# Patient Record
Sex: FEMALE | Race: WHITE | NOT HISPANIC OR LATINO | Employment: FULL TIME | URBAN - NONMETROPOLITAN AREA
[De-identification: names, ages, dates, MRNs, and addresses within clinical notes are randomized per-mention and may not be internally consistent; named-entity substitution may affect disease eponyms.]

---

## 2022-11-28 ENCOUNTER — OFFICE VISIT (OUTPATIENT)
Dept: OTOLARYNGOLOGY | Facility: CLINIC | Age: 27
End: 2022-11-28

## 2022-11-28 VITALS — BODY MASS INDEX: 24.85 KG/M2 | TEMPERATURE: 97.3 F | WEIGHT: 173.6 LBS | HEIGHT: 70 IN

## 2022-11-28 DIAGNOSIS — J32.9 CHRONIC SINUSITIS, UNSPECIFIED LOCATION: ICD-10-CM

## 2022-11-28 DIAGNOSIS — J34.3 HYPERTROPHY OF INFERIOR NASAL TURBINATE: ICD-10-CM

## 2022-11-28 DIAGNOSIS — J34.2 DEVIATED NASAL SEPTUM: ICD-10-CM

## 2022-11-28 DIAGNOSIS — J31.0 CHRONIC RHINITIS: Primary | ICD-10-CM

## 2022-11-28 PROCEDURE — 99203 OFFICE O/P NEW LOW 30 MIN: CPT | Performed by: EMERGENCY MEDICINE

## 2022-11-28 RX ORDER — MULTIPLE VITAMINS W/ MINERALS TAB 9MG-400MCG
1 TAB ORAL DAILY
COMMUNITY

## 2022-11-28 RX ORDER — BUPROPION HYDROCHLORIDE 200 MG/1
TABLET, EXTENDED RELEASE ORAL
COMMUNITY
Start: 2022-09-12

## 2022-11-28 NOTE — PROGRESS NOTES
RICKY Cheema  [facility] EAR NOSE & THROAT  [address], [address]  [facility] [address]  Fax [phone]  Phone [phone]      Visit Type: NEW PATIENT   Chief Complaint   Patient presents with   • Sinus Problem        HPI  HISTORY OF PRESENT ILLNESS:  Angle Valdivia is a 27 y.o. female presents for an initial evaluation of sinus complaints. Her main symptoms include nasal drainage, nasal congestion, repeated sinusitis and sinus pressure. She has had symptoms that have been relatively constant for the last year. The patient denies: epistaxis, sneezing, itchy nose and itchy eyes. In an average year, the number of antibiotics needed have been: 6-7. Previous antibiotics have included: Amoxicillin and Cephalexin. The patient has also tried: decongestants, prescription nasal sprays, intranasal corticosteroids, intranasal antihistamies, oral steroids and antibiotic therapywith: continued symptoms. She has had allergy testingwhich was: negative. She has not had prior sinus imaging.    She is currently using flonase daily.    History reviewed. No pertinent past medical history.    Past Surgical History:   Procedure Laterality Date   • KNEE SURGERY Bilateral 2011 2012,2015       Family History: Her family history is not on file.     Social History: She  reports that she has never smoked. She has never used smokeless tobacco. She reports current alcohol use. She reports that she does not use drugs.    Home Medications:  Levocetirizine Dihydrochloride, buPROPion SR, and multivitamin with minerals    Allergies:  She has No Known Allergies.       Vital Signs:   Temp:  [97.3 °F (36.3 °C)] 97.3 °F (36.3 °C)  ENT Physical Exam  Constitutional  Appearance: patient appears well-developed, well-nourished and well-groomed,  Communication/Voice: communication appropriate for developmental age; vocal quality normal;  Head and Face  Appearance: head appears  normal, face appears normal and face appears atraumatic;  Palpation: facial palpation normal;  Salivary: glands normal;  Ear  Hearing: intact;  Auricles: right auricle normal; left auricle normal;  External Mastoids: right external mastoid normal; left external mastoid normal;  Ear Canals: right ear canal normal; left ear canal normal;  Tympanic Membranes: right tympanic membrane normal; left tympanic membrane normal;  Nose  Internal Nose: bilateral intranasal mucosa edematous and erythematous; nasal septal deviation present; bilateral inferior turbinates erythematous; with hypertrophy;  Oral Cavity/Oropharynx  Lips: normal;  Teeth: normal;  Gums: gingiva normal;  Tongue: normal;  Oral mucosa: normal;  Hard palate: normal;  Soft palate: normal;  Tonsils: normal;  Neck  Neck: neck normal; neck palpation normal;  Respiratory  Inspection: breathing unlabored; normal breathing rate;  Cardiovascular  Inspection: extremities are warm and well perfused;  Auscultation: regular rate and rhythm;  Lymphatic  Palpation: lymph nodes normal;         Result Review    RESULTS REVIEW    I have reviewed the patients old records in the chart.     Assessment & Plan    Diagnoses and all orders for this visit:    1. Chronic rhinitis (Primary)  -     CT Sinus Without Contrast    2. Chronic sinusitis, unspecified location  -     CT Sinus Without Contrast       Continue nasal sprays as previously prescribed.  For the best response, use your nasal sprays every day without skipping doses. It may take several weeks before the full effect is acheived.   We will get sinus imaging and follow up in 4-6 weeks.    Return in about 6 weeks (around 1/9/2023) for Follow up with me when Dr. Rudolph in clinic, Follow up with CT Sinuses.      Shruthi Armenta, APRN  11/28/22  14:29 CST

## 2023-01-09 ENCOUNTER — OFFICE VISIT (OUTPATIENT)
Dept: OTOLARYNGOLOGY | Facility: CLINIC | Age: 28
End: 2023-01-09
Payer: COMMERCIAL

## 2023-01-09 ENCOUNTER — HOSPITAL ENCOUNTER (OUTPATIENT)
Dept: CT IMAGING | Facility: HOSPITAL | Age: 28
Discharge: HOME | End: 2023-01-09
Admitting: EMERGENCY MEDICINE
Payer: COMMERCIAL

## 2023-01-09 VITALS — TEMPERATURE: 97.3 F | BODY MASS INDEX: 24.82 KG/M2 | WEIGHT: 173.4 LBS | HEIGHT: 70 IN

## 2023-01-09 DIAGNOSIS — J34.3 HYPERTROPHY OF INFERIOR NASAL TURBINATE: ICD-10-CM

## 2023-01-09 DIAGNOSIS — J34.1 MUCOUS RETENTION CYST OF MAXILLARY SINUS: ICD-10-CM

## 2023-01-09 DIAGNOSIS — J31.0 CHRONIC RHINITIS: Primary | ICD-10-CM

## 2023-01-09 DIAGNOSIS — J32.9 CHRONIC SINUSITIS, UNSPECIFIED LOCATION: ICD-10-CM

## 2023-01-09 PROCEDURE — 70486 CT MAXILLOFACIAL W/O DYE: CPT

## 2023-01-09 PROCEDURE — 99214 OFFICE O/P EST MOD 30 MIN: CPT | Performed by: OTOLARYNGOLOGY

## 2023-01-09 RX ORDER — OXYMETAZOLINE HYDROCHLORIDE 0.05 G/100ML
2 SPRAY NASAL ONCE
Status: CANCELLED | OUTPATIENT
Start: 2023-01-09

## 2023-01-09 RX ORDER — OXYMETAZOLINE HYDROCHLORIDE 0.05 G/100ML
2 SPRAY NASAL
Status: CANCELLED | OUTPATIENT
Start: 2023-01-09 | End: 2023-01-10

## 2023-01-09 NOTE — H&P (VIEW-ONLY)
RICKY Cheema [facility]NT [facility] EAR NOSE & THROAT  [address], [address]  [facility] [address]  Fax [phone]  Phone [phone]      Visit Type: FOLLOW UP   Chief Complaint   Patient presents with   • Sinus Problem     CT F/U        HPI  She presents for a follow up evaluation. She has had continued problems with nasal drainage, nasal congestion and sinus pressure. The symptoms are not localized to a particular location. The symptoms severity was described as: moderate The symptoms have been: relatively constant for the last year There have been no identified factors that aggravate the symptoms. The patient has been treated with antibiotics, intranasal fluticasone and steroids in the past with continued symptoms.      History reviewed. No pertinent past medical history.    Past Surgical History:   Procedure Laterality Date   • KNEE SURGERY Bilateral 2011 2012,2015       Family History: Her family history is not on file.     Social History: She  reports that she has never smoked. She has never used smokeless tobacco. She reports current alcohol use. She reports that she does not use drugs.    Home Medications:  Levocetirizine Dihydrochloride, buPROPion SR, and multivitamin with minerals    Allergies:  She has No Known Allergies.       Vital Signs:   Temp:  [97.3 °F (36.3 °C)] 97.3 °F (36.3 °C)  ENT Physical Exam  Constitutional  Appearance: patient appears well-developed, well-nourished and well-groomed,  Communication/Voice: communication appropriate for developmental age; vocal quality normal;  Head and Face  Appearance: head appears normal, face appears normal and face appears atraumatic;  Palpation: facial palpation normal;  Salivary: glands normal;  Ear  Hearing: intact;  Auricles: right auricle normal; left auricle normal;  External Mastoids: right external mastoid normal; left external mastoid normal;  Ear Canals: right ear canal normal;  left ear canal normal;  Tympanic Membranes: right tympanic membrane normal; left tympanic membrane normal;  Nose  Internal Nose: bilateral intranasal mucosa edematous and erythematous; bilateral inferior turbinates with hypertrophy;  Oral Cavity/Oropharynx  Lips: normal;  Teeth: normal;  Gums: gingiva normal;  Tongue: normal;  Oral mucosa: normal;  Hard palate: normal;  Neck  Neck: neck normal;  Respiratory  Inspection: breathing unlabored;  Cardiovascular  Inspection: extremities are warm and well perfused;  Lymphatic  Palpation: lymph nodes normal;         Result Review    RESULTS REVIEW    I have reviewed the patients old records in the chart.     Assessment & Plan    Diagnoses and all orders for this visit:    1. Chronic rhinitis (Primary)  -     oxymetazoline (AFRIN) nasal spray 2 spray  -     oxymetazoline (AFRIN) nasal spray 2 spray  -     dexamethasone (DECADRON) 8 mg in sodium chloride 0.9 % IVPB  -     Case Request; Standing  -     Case Request    2. Chronic sinusitis, unspecified location  -     oxymetazoline (AFRIN) nasal spray 2 spray  -     oxymetazoline (AFRIN) nasal spray 2 spray  -     dexamethasone (DECADRON) 8 mg in sodium chloride 0.9 % IVPB  -     Case Request; Standing  -     Case Request    3. Hypertrophy of inferior nasal turbinate  -     oxymetazoline (AFRIN) nasal spray 2 spray  -     oxymetazoline (AFRIN) nasal spray 2 spray  -     dexamethasone (DECADRON) 8 mg in sodium chloride 0.9 % IVPB  -     Case Request; Standing  -     Case Request    4. Mucous retention cyst of maxillary sinus  -     oxymetazoline (AFRIN) nasal spray 2 spray  -     oxymetazoline (AFRIN) nasal spray 2 spray  -     dexamethasone (DECADRON) 8 mg in sodium chloride 0.9 % IVPB  -     Case Request; Standing  -     Case Request    Other orders  -     Follow Anesthesia Guidelines / Protocol; Future  -     Provide Patient With Instructions on NPO Status  -     Follow Anesthesia Guidelines / Protocol; Standing  -     Verify  NPO Status; Standing  -     Obtain Informed Consent; Standing  -     SCD (Sequential Compression Device) - To Be Placed on Patient in Pre-Op; Standing  -     Patient to Void Prior to Transfer to OR; Standing       Medical and surgical options were discussed including medical and surgical options. Risks, benefits and alternatives were discussed and questions were answered. After considering the options, the patient decided to proceed with surgery.     -----SURGERY SCHEDULING:-----  Schedule functional endoscopic sinus surgery with balloon dilation (Bilateral), TURBINOPLASTY WITH COBLATION    ---INFORMED CONSENT DISCUSSION:---  FUNCTIONAL ENDOSCOPIC SINUS SURGERY: A functional endoscopic sinus surgery was recommended. The risks and benefits were explained including but not limited to pain, bleeding (with the possible need for nasal packing), infection, risks of the general anesthesia, orbital injury with blurred vision or visual loss, cerebrospinal fluid leak, persistent disease, scarring, synichiae and the possibility for the need of reoperation. Possibilities of additional sinus work or less sinus work depending on the status of the nose at the time of the operation was discussed. Alternatives were discussed. No guarantees were made or implied. Questions were asked appropriately answered.    BALLOON SINUPLASTY: The risks and benefits were explained including but not limited to pain, bleeding (with the possible need for nasal packing), infection, risks of the anesthesia, possible incomplete response to anesthesia requiring a conversion to a general anesthesia at a later date, orbital injury with blurred vision or visual loss, cerebrospinal fluid leak, persistent disease and/ or symptoms, scarring, synichiae and the possibility for the need of reoperation. Possibilities of an inability to canulate and dilate the sinuses at the time of the operation werediscussed. Alternatives were discussed. No guarantees were made or  implied. Questions were asked appropriately answered.      ---PREOPERATIVE WORKUP:---  labs/ workup per anesthesia      Return for Post Operatively.      Shruthi LANIE Armenta, APRN  01/09/23  16:10 CST       Physician Attestation  I have seen and examined Angle Valdivia and have reviewed the notes, assessments, and/or procedures and I concur with this documentation.    SUBJECTIVE:  She has had a history of persistent sinusitis complaints.  She has chronic nasal drainage, nasal congestion repeated sinusitis with bilateral maxillary pressure.  She is on approximately 6-7 antibiotics a year.  She has symptoms between the antibiotics.  CT scanning is shown left greater than right maxillary ostiomeatal complex edema and a polypoid swelling of the roof of the left maxillary sinus.  She has inferior turbinate congestion.    OBJECTIVE:  On examination she has a lot of congestion of the inferior turbinates.  There is no septal deviation.    Diagnoses and all orders for this visit:    1. Chronic rhinitis (Primary)  -     oxymetazoline (AFRIN) nasal spray 2 spray  -     oxymetazoline (AFRIN) nasal spray 2 spray  -     dexamethasone (DECADRON) 8 mg in sodium chloride 0.9 % IVPB  -     Case Request; Standing  -     Case Request    2. Chronic sinusitis, unspecified location  -     oxymetazoline (AFRIN) nasal spray 2 spray  -     oxymetazoline (AFRIN) nasal spray 2 spray  -     dexamethasone (DECADRON) 8 mg in sodium chloride 0.9 % IVPB  -     Case Request; Standing  -     Case Request    3. Hypertrophy of inferior nasal turbinate  -     oxymetazoline (AFRIN) nasal spray 2 spray  -     oxymetazoline (AFRIN) nasal spray 2 spray  -     dexamethasone (DECADRON) 8 mg in sodium chloride 0.9 % IVPB  -     Case Request; Standing  -     Case Request    4. Mucous retention cyst of maxillary sinus  -     oxymetazoline (AFRIN) nasal spray 2 spray  -     oxymetazoline (AFRIN) nasal spray 2 spray  -     dexamethasone (DECADRON) 8 mg in sodium  chloride 0.9 % IVPB  -     Case Request; Standing  -     Case Request    Other orders  -     Follow Anesthesia Guidelines / Protocol; Future  -     Provide Patient With Instructions on NPO Status  -     Follow Anesthesia Guidelines / Protocol; Standing  -     Verify NPO Status; Standing  -     Obtain Informed Consent; Standing  -     SCD (Sequential Compression Device) - To Be Placed on Patient in Pre-Op; Standing  -     Patient to Void Prior to Transfer to OR; Standing      We discussed options of continued medical management versus functional endoscopic sinus surgery.  I discussed the possibility of in office balloon sinuplasty and turbinoplasty versus in the hospital OR.  She has elected to proceed with OR turbinate reduction and balloon maxillary sinuplasty.  The risk benefits and alternatives were discussed as mentioned above.    Electronically signed by Jason Rudolph MD, 01/10/23, 4:04 PM CST.

## 2023-01-09 NOTE — PROGRESS NOTES
RICKY Cheema ENT [facility] EAR NOSE & THROAT  [address], [address]  [facility] [address]  Fax [phone]  Phone [phone]      Visit Type: FOLLOW UP   Chief Complaint   Patient presents with   • Sinus Problem     CT F/U        HPI  She presents for a follow up evaluation. She has had continued problems with nasal drainage, nasal congestion and sinus pressure. The symptoms are not localized to a particular location. The symptoms severity was described as: moderate The symptoms have been: relatively constant for the last year There have been no identified factors that aggravate the symptoms. The patient has been treated with antibiotics, intranasal fluticasone and steroids in the past with continued symptoms.      History reviewed. No pertinent past medical history.    Past Surgical History:   Procedure Laterality Date   • KNEE SURGERY Bilateral 2011 2012,2015       Family History: Her family history is not on file.     Social History: She  reports that she has never smoked. She has never used smokeless tobacco. She reports current alcohol use. She reports that she does not use drugs.    Home Medications:  Levocetirizine Dihydrochloride, buPROPion SR, and multivitamin with minerals    Allergies:  She has No Known Allergies.       Vital Signs:   Temp:  [97.3 °F (36.3 °C)] 97.3 °F (36.3 °C)  ENT Physical Exam  Constitutional  Appearance: patient appears well-developed, well-nourished and well-groomed,  Communication/Voice: communication appropriate for developmental age; vocal quality normal;  Head and Face  Appearance: head appears normal, face appears normal and face appears atraumatic;  Palpation: facial palpation normal;  Salivary: glands normal;  Ear  Hearing: intact;  Auricles: right auricle normal; left auricle normal;  External Mastoids: right external mastoid normal; left external mastoid normal;  Ear Canals: right ear canal normal;  left ear canal normal;  Tympanic Membranes: right tympanic membrane normal; left tympanic membrane normal;  Nose  Internal Nose: bilateral intranasal mucosa edematous and erythematous; bilateral inferior turbinates with hypertrophy;  Oral Cavity/Oropharynx  Lips: normal;  Teeth: normal;  Gums: gingiva normal;  Tongue: normal;  Oral mucosa: normal;  Hard palate: normal;  Neck  Neck: neck normal;  Respiratory  Inspection: breathing unlabored;  Cardiovascular  Inspection: extremities are warm and well perfused;  Lymphatic  Palpation: lymph nodes normal;         Result Review    RESULTS REVIEW    I have reviewed the patients old records in the chart.     Assessment & Plan    Diagnoses and all orders for this visit:    1. Chronic rhinitis (Primary)  -     oxymetazoline (AFRIN) nasal spray 2 spray  -     oxymetazoline (AFRIN) nasal spray 2 spray  -     dexamethasone (DECADRON) 8 mg in sodium chloride 0.9 % IVPB  -     Case Request; Standing  -     Case Request    2. Chronic sinusitis, unspecified location  -     oxymetazoline (AFRIN) nasal spray 2 spray  -     oxymetazoline (AFRIN) nasal spray 2 spray  -     dexamethasone (DECADRON) 8 mg in sodium chloride 0.9 % IVPB  -     Case Request; Standing  -     Case Request    3. Hypertrophy of inferior nasal turbinate  -     oxymetazoline (AFRIN) nasal spray 2 spray  -     oxymetazoline (AFRIN) nasal spray 2 spray  -     dexamethasone (DECADRON) 8 mg in sodium chloride 0.9 % IVPB  -     Case Request; Standing  -     Case Request    4. Mucous retention cyst of maxillary sinus  -     oxymetazoline (AFRIN) nasal spray 2 spray  -     oxymetazoline (AFRIN) nasal spray 2 spray  -     dexamethasone (DECADRON) 8 mg in sodium chloride 0.9 % IVPB  -     Case Request; Standing  -     Case Request    Other orders  -     Follow Anesthesia Guidelines / Protocol; Future  -     Provide Patient With Instructions on NPO Status  -     Follow Anesthesia Guidelines / Protocol; Standing  -     Verify  NPO Status; Standing  -     Obtain Informed Consent; Standing  -     SCD (Sequential Compression Device) - To Be Placed on Patient in Pre-Op; Standing  -     Patient to Void Prior to Transfer to OR; Standing       Medical and surgical options were discussed including medical and surgical options. Risks, benefits and alternatives were discussed and questions were answered. After considering the options, the patient decided to proceed with surgery.     -----SURGERY SCHEDULING:-----  Schedule functional endoscopic sinus surgery with balloon dilation (Bilateral), TURBINOPLASTY WITH COBLATION    ---INFORMED CONSENT DISCUSSION:---  FUNCTIONAL ENDOSCOPIC SINUS SURGERY: A functional endoscopic sinus surgery was recommended. The risks and benefits were explained including but not limited to pain, bleeding (with the possible need for nasal packing), infection, risks of the general anesthesia, orbital injury with blurred vision or visual loss, cerebrospinal fluid leak, persistent disease, scarring, synichiae and the possibility for the need of reoperation. Possibilities of additional sinus work or less sinus work depending on the status of the nose at the time of the operation was discussed. Alternatives were discussed. No guarantees were made or implied. Questions were asked appropriately answered.    BALLOON SINUPLASTY: The risks and benefits were explained including but not limited to pain, bleeding (with the possible need for nasal packing), infection, risks of the anesthesia, possible incomplete response to anesthesia requiring a conversion to a general anesthesia at a later date, orbital injury with blurred vision or visual loss, cerebrospinal fluid leak, persistent disease and/ or symptoms, scarring, synichiae and the possibility for the need of reoperation. Possibilities of an inability to canulate and dilate the sinuses at the time of the operation werediscussed. Alternatives were discussed. No guarantees were made or  implied. Questions were asked appropriately answered.      ---PREOPERATIVE WORKUP:---  labs/ workup per anesthesia      Return for Post Operatively.      Shruthi LANIE Armenta, APRN  01/09/23  16:10 CST       Physician Attestation  I have seen and examined Angle Valdivia and have reviewed the notes, assessments, and/or procedures and I concur with this documentation.    SUBJECTIVE:  She has had a history of persistent sinusitis complaints.  She has chronic nasal drainage, nasal congestion repeated sinusitis with bilateral maxillary pressure.  She is on approximately 6-7 antibiotics a year.  She has symptoms between the antibiotics.  CT scanning is shown left greater than right maxillary ostiomeatal complex edema and a polypoid swelling of the roof of the left maxillary sinus.  She has inferior turbinate congestion.    OBJECTIVE:  On examination she has a lot of congestion of the inferior turbinates.  There is no septal deviation.    Diagnoses and all orders for this visit:    1. Chronic rhinitis (Primary)  -     oxymetazoline (AFRIN) nasal spray 2 spray  -     oxymetazoline (AFRIN) nasal spray 2 spray  -     dexamethasone (DECADRON) 8 mg in sodium chloride 0.9 % IVPB  -     Case Request; Standing  -     Case Request    2. Chronic sinusitis, unspecified location  -     oxymetazoline (AFRIN) nasal spray 2 spray  -     oxymetazoline (AFRIN) nasal spray 2 spray  -     dexamethasone (DECADRON) 8 mg in sodium chloride 0.9 % IVPB  -     Case Request; Standing  -     Case Request    3. Hypertrophy of inferior nasal turbinate  -     oxymetazoline (AFRIN) nasal spray 2 spray  -     oxymetazoline (AFRIN) nasal spray 2 spray  -     dexamethasone (DECADRON) 8 mg in sodium chloride 0.9 % IVPB  -     Case Request; Standing  -     Case Request    4. Mucous retention cyst of maxillary sinus  -     oxymetazoline (AFRIN) nasal spray 2 spray  -     oxymetazoline (AFRIN) nasal spray 2 spray  -     dexamethasone (DECADRON) 8 mg in sodium  chloride 0.9 % IVPB  -     Case Request; Standing  -     Case Request    Other orders  -     Follow Anesthesia Guidelines / Protocol; Future  -     Provide Patient With Instructions on NPO Status  -     Follow Anesthesia Guidelines / Protocol; Standing  -     Verify NPO Status; Standing  -     Obtain Informed Consent; Standing  -     SCD (Sequential Compression Device) - To Be Placed on Patient in Pre-Op; Standing  -     Patient to Void Prior to Transfer to OR; Standing      We discussed options of continued medical management versus functional endoscopic sinus surgery.  I discussed the possibility of in office balloon sinuplasty and turbinoplasty versus in the hospital OR.  She has elected to proceed with OR turbinate reduction and balloon maxillary sinuplasty.  The risk benefits and alternatives were discussed as mentioned above.    Electronically signed by Jason Rudolph MD, 01/10/23, 4:04 PM CST.

## 2023-01-10 PROBLEM — J34.1 MUCOUS RETENTION CYST OF MAXILLARY SINUS: Status: ACTIVE | Noted: 2023-01-10

## 2023-01-13 ENCOUNTER — APPOINTMENT (OUTPATIENT)
Dept: PREADMISSION TESTING | Facility: HOSPITAL | Age: 28
End: 2023-01-13
Payer: COMMERCIAL

## 2023-01-16 ENCOUNTER — PRE-ADMISSION TESTING (OUTPATIENT)
Dept: PREADMISSION TESTING | Facility: HOSPITAL | Age: 28
End: 2023-01-16
Payer: COMMERCIAL

## 2023-01-16 VITALS
OXYGEN SATURATION: 100 % | HEART RATE: 75 BPM | HEIGHT: 71 IN | RESPIRATION RATE: 16 BRPM | SYSTOLIC BLOOD PRESSURE: 116 MMHG | DIASTOLIC BLOOD PRESSURE: 70 MMHG | BODY MASS INDEX: 23.61 KG/M2 | WEIGHT: 168.65 LBS

## 2023-01-16 LAB
DEPRECATED RDW RBC AUTO: 48.4 FL (ref 37–54)
ERYTHROCYTE [DISTWIDTH] IN BLOOD BY AUTOMATED COUNT: 13 % (ref 12.3–15.4)
HCT VFR BLD AUTO: 35 % (ref 34–46.6)
HGB BLD-MCNC: 11 G/DL (ref 12–15.9)
MCH RBC QN AUTO: 31.8 PG (ref 26.6–33)
MCHC RBC AUTO-ENTMCNC: 31.4 G/DL (ref 31.5–35.7)
MCV RBC AUTO: 101.2 FL (ref 79–97)
PLATELET # BLD AUTO: 215 10*3/MM3 (ref 140–450)
PMV BLD AUTO: 9.9 FL (ref 6–12)
RBC # BLD AUTO: 3.46 10*6/MM3 (ref 3.77–5.28)
WBC NRBC COR # BLD: 5.29 10*3/MM3 (ref 3.4–10.8)

## 2023-01-16 PROCEDURE — 85027 COMPLETE CBC AUTOMATED: CPT

## 2023-01-16 PROCEDURE — 36415 COLL VENOUS BLD VENIPUNCTURE: CPT

## 2023-01-16 RX ORDER — SACCHAROMYCES BOULARDII 250 MG
250 CAPSULE ORAL DAILY
COMMUNITY

## 2023-01-16 NOTE — DISCHARGE INSTRUCTIONS
Before you come to the hospital        Arrival time: AS DIRECTED BY OFFICE      CHECK WITH YOUR PHYSICIAN (especially if   you are taking diabetes medicines or blood thinners)              Eating and drinking restrictions prior to scheduled arrival time    2 Hours before arrival time STOP   Drinking Clear liquids (water, apple juice-no pulp)     6 Hours before arrival time STOP   Milk or drinks that contain milk, full liquids    6 Hours before arrival time STOP   Light meals or foods, such as toast or cereal    8 Hours before arrival time STOP   Heavy foods, such as meat, fried foods, or fatty foods    (It is extremely important that you follow these guidelines to prevent delay or cancelation of your procedure)     Clear Liquids  Water and flavored water                                                                      Clear Fruit juices, such as cranberry juice and apple juice.  Black coffee (NO cream of any kind, including powdered).  Plain tea  Clear bouillon or broth.  Flavored gelatin.  Soda.  Gatorade or Powerade.  Full liquid examples  Juices that have pulp.  Frozen ice pops that contain fruit pieces.  Coffee with creamer  Milk.  Yogurt.                MANAGING PAIN AFTER SURGERY    We know you are probably wondering what your pain will be like after surgery.  Following surgery it is unrealistic to expect you will not have pain.   Pain is how our bodies let us know that something is wrong or cautions us to be careful.  That said, our goal is to make your pain tolerable.    Methods we may use to treat your pain include (oral or IV medications, PCAs, epidurals, nerve blocks, etc.)   While some procedures require IV pain medications for a short time after surgery, transitioning to pain medications by mouth allows for better management of pain.   Your nurse will encourage you to take oral pain medications whenever possible.  IV medications work almost immediately, but only last a short while.  Taking  medications by mouth allows for a more constant level of medication in your blood stream for a longer period of time.      Once your pain is out of control it is harder to get back under control.  It is important you are aware when your next dose of pain medication is due.  If you are admitted, your nurse may write the time of your next dose on the white board in your room to help you remember.      We are interested in your pain and encourage you to inform us about aggravating factors during your visit.   Many times a simple repositioning every few hours can make a big difference.    If your physician says it is okay, do not let your pain prevent you from getting out of bed. Be sure to call your nurse for assistance prior to getting up so you do not fall.      Before surgery, please decide your tolerable pain goal.  These faces help describe the pain ratings we use on a 0-10 scale.   Be prepared to tell us your goal and whether or not you take pain or anxiety medications at home.          Preparing for Surgery  Preparing for surgery is an important part of your care. It can make things go more smoothly and help you avoid complications. The steps leading up to surgery may vary among hospitals. Follow all instructions given to you by your health care providers. Ask questions if you do not understand something. Talk about any concerns that you have.  Here are some questions to consider asking before your surgery:  If my surgery is not an emergency (is elective), when would be the best time to have the surgery?  What arrangements do I need to make for work, home, or school?  What will my recovery be like? How long will it be before I can return to normal activities?  Will I need to prepare my home? Will I need to arrange care for me or my children?  Should I expect to have pain after surgery? What are my pain management options? Are there nonmedical options that I can try for pain?  Tell a health care provider  about:  Any allergies you have.  All medicines you are taking, including vitamins, herbs, eye drops, creams, and over-the-counter medicines.  Any problems you or family members have had with anesthetic medicines.  Any blood disorders you have.  Any surgeries you have had.  Any medical conditions you have.  Whether you are pregnant or may be pregnant.  What are the risks?  The risks and complications of surgery depend on the specific procedure that you have. Discuss all the risks with your health care providers before your surgery. Ask about common surgical complications, which may include:  Infection.  Bleeding or a need for blood replacement (transfusion).  Allergic reactions to medicines.  Damage to surrounding nerves, tissues, or structures.  A blood clot.  Scarring.  Failure of the surgery to correct the problem.  Follow these instructions before the procedure:  Several days or weeks before your procedure  You may have a physical exam by your primary health care provider to make sure it is safe for you to have surgery.  You may have testing. This may include a chest X-ray, blood and urine tests, electrocardiogram (ECG), or other testing.  Ask your health care provider about:  Changing or stopping your regular medicines. This is especially important if you are taking diabetes medicines or blood thinners.  Taking medicines such as aspirin and ibuprofen. These medicines can thin your blood. Do not take these medicines unless your health care provider tells you to take them.  Taking over-the-counter medicines, vitamins, herbs, and supplements.  Do not use any products that contain nicotine or tobacco, such as cigarettes and e-cigarettes. If you need help quitting, ask your health care provider.  Avoid alcohol.  Ask your health care provider if there are exercises you can do to prepare for surgery.  Eat a healthy diet.   Plan to have someone take you home from the hospital or clinic.  Plan to have a responsible adult  care for you for at least 24 hours after you leave the hospital or clinic. This is important.  The day before your procedure  You may be given antibiotic medicine to take by mouth to help prevent infection. Take it as told by your health care provider.  You may be asked to shower with a germ-killing soap.  Follow instructions from your health care provider about eating and drinking restrictions. This includes gum, mints and hard candy.  Pack comfortable clothes according to your procedure.   The day of your procedure  You may need to take another shower with a germ-killing soap before you leave home in the morning.  With a small sip of water, take only the medicines that you are told to take.  Remove all jewelry including rings.   Leave anything you consider valuable at home except hearing aids if needed.  You do not need to bring your home medications into the hospital.   Do not wear any makeup, nail polish, powder, deodorant, lotion, hair accessories, or anything on your skin or body except your clothes.  If you will be staying in the hospital, bring a case to hold your glasses, contacts, or dentures. You may also want to bring your robe and non-skid footwear.       (Do not use denture adhesives since you will be asked to remove them during  surgery).   If you wear oxygen at home, bring it with you the day of surgery.  If instructed by your health care provider, bring your sleep apnea device with you on the day of your surgery (if this applies to you).  You may want to leave your suitcase and sleep apnea device in the car until after surgery.   Arrive at the hospital as scheduled.  Bring a friend or family member with you who can help to answer questions and be present while you meet with your health care provider.  At the hospital  When you arrive at the hospital:  Go to registration located at the main entrance of the hospital. You will be registered and given a beeper and a sticker sheet. Take the stickers to  the Outpatient nurses desk and place in the black tray. This is to notify staff that you have arrived. Then return to the lobby to wait.   When your beeper lights up and vibrates proceed through the double doors, under the stairs, and a member of the Outpatient Surgery staff will escort you to your preoperative room.  You may have to wear compression sleeves. These help to prevent blood clots and reduce swelling in your legs.  An IV may be inserted into one of your veins.              In the operating room, you may be given one or more of the following:        A medicine to help you relax (sedative).        A medicine to numb the area (local anesthetic).        A medicine to make you fall asleep (general anesthetic).        A medicine that is injected into an area of your body to numb everything below the                      injection site (regional anesthetic).  You may be given an antibiotic through your IV to help prevent infection.  Your surgical site will be marked or identified.    Contact a health care provider if you:  Develop a fever of more than 100.4°F (38°C) or other feelings of illness during the 48 hours before your surgery.  Have symptoms that get worse.  Have questions or concerns about your surgery.  Summary  Preparing for surgery can make the procedure go more smoothly and lower your risk of complications.  Before surgery, make a list of questions and concerns to discuss with your surgeon. Ask about the risks and possible complications.  In the days or weeks before your surgery, follow all instructions from your health care provider. You may need to stop smoking, avoid alcohol, follow eating restrictions, and change or stop your regular medicines.  Contact your surgeon if you develop a fever or other signs of illness during the few days before your surgery.  This information is not intended to replace advice given to you by your health care provider. Make sure you discuss any questions you have  with your health care provider.  Document Revised: 12/21/2018 Document Reviewed: 10/23/2018  Elsevier Patient Education © 2021 [employer]

## 2023-01-17 ENCOUNTER — TELEPHONE (OUTPATIENT)
Dept: OTOLARYNGOLOGY | Facility: CLINIC | Age: 28
End: 2023-01-17
Payer: COMMERCIAL

## 2023-01-18 ENCOUNTER — HOSPITAL ENCOUNTER (OUTPATIENT)
Facility: HOSPITAL | Age: 28
Setting detail: HOSPITAL OUTPATIENT SURGERY
Discharge: HOME | End: 2023-01-18
Attending: OTOLARYNGOLOGY | Admitting: OTOLARYNGOLOGY
Payer: COMMERCIAL

## 2023-01-18 ENCOUNTER — ANESTHESIA EVENT (OUTPATIENT)
Dept: PERIOP | Facility: HOSPITAL | Age: 28
End: 2023-01-18
Payer: COMMERCIAL

## 2023-01-18 ENCOUNTER — ANESTHESIA (OUTPATIENT)
Dept: PERIOP | Facility: HOSPITAL | Age: 28
End: 2023-01-18
Payer: COMMERCIAL

## 2023-01-18 VITALS
HEART RATE: 68 BPM | OXYGEN SATURATION: 97 % | TEMPERATURE: 97.5 F | SYSTOLIC BLOOD PRESSURE: 121 MMHG | DIASTOLIC BLOOD PRESSURE: 83 MMHG | RESPIRATION RATE: 16 BRPM

## 2023-01-18 DIAGNOSIS — J31.0 CHRONIC RHINITIS: ICD-10-CM

## 2023-01-18 DIAGNOSIS — J34.3 HYPERTROPHY OF INFERIOR NASAL TURBINATE: ICD-10-CM

## 2023-01-18 DIAGNOSIS — J34.1 MUCOUS RETENTION CYST OF MAXILLARY SINUS: ICD-10-CM

## 2023-01-18 DIAGNOSIS — J32.9 CHRONIC SINUSITIS, UNSPECIFIED LOCATION: ICD-10-CM

## 2023-01-18 LAB — B-HCG UR QL: NEGATIVE

## 2023-01-18 PROCEDURE — 30802 ABLATE INF TURBINATE SUBMUC: CPT | Performed by: OTOLARYNGOLOGY

## 2023-01-18 PROCEDURE — 25010000002 DEXAMETHASONE PER 1 MG: Performed by: NURSE ANESTHETIST, CERTIFIED REGISTERED

## 2023-01-18 PROCEDURE — 25010000002 EPINEPHRINE PER 0.1 MG: Performed by: OTOLARYNGOLOGY

## 2023-01-18 PROCEDURE — 81025 URINE PREGNANCY TEST: CPT | Performed by: OTOLARYNGOLOGY

## 2023-01-18 PROCEDURE — C1726 CATH, BAL DIL, NON-VASCULAR: HCPCS | Performed by: OTOLARYNGOLOGY

## 2023-01-18 PROCEDURE — 31295 NSL/SINS NDSC SURG MAX SINS: CPT | Performed by: OTOLARYNGOLOGY

## 2023-01-18 PROCEDURE — 25010000002 DEXAMETHASONE PER 1 MG: Performed by: EMERGENCY MEDICINE

## 2023-01-18 PROCEDURE — 30930 THER FX NASAL INF TURBINATE: CPT | Performed by: OTOLARYNGOLOGY

## 2023-01-18 PROCEDURE — 25010000002 ONDANSETRON PER 1 MG: Performed by: NURSE ANESTHETIST, CERTIFIED REGISTERED

## 2023-01-18 PROCEDURE — 25010000002 FENTANYL CITRATE (PF) 100 MCG/2ML SOLUTION: Performed by: NURSE ANESTHETIST, CERTIFIED REGISTERED

## 2023-01-18 PROCEDURE — 25010000002 PROPOFOL 10 MG/ML EMULSION: Performed by: NURSE ANESTHETIST, CERTIFIED REGISTERED

## 2023-01-18 RX ORDER — IBUPROFEN 600 MG/1
600 TABLET ORAL ONCE AS NEEDED
Status: DISCONTINUED | OUTPATIENT
Start: 2023-01-18 | End: 2023-01-18 | Stop reason: HOSPADM

## 2023-01-18 RX ORDER — LIDOCAINE HYDROCHLORIDE 20 MG/ML
INJECTION, SOLUTION EPIDURAL; INFILTRATION; INTRACAUDAL; PERINEURAL AS NEEDED
Status: DISCONTINUED | OUTPATIENT
Start: 2023-01-18 | End: 2023-01-18 | Stop reason: SURG

## 2023-01-18 RX ORDER — LIDOCAINE HYDROCHLORIDE 10 MG/ML
0.5 INJECTION, SOLUTION EPIDURAL; INFILTRATION; INTRACAUDAL; PERINEURAL ONCE AS NEEDED
Status: DISCONTINUED | OUTPATIENT
Start: 2023-01-18 | End: 2023-01-18 | Stop reason: HOSPADM

## 2023-01-18 RX ORDER — NALOXONE HCL 0.4 MG/ML
0.4 VIAL (ML) INJECTION AS NEEDED
Status: DISCONTINUED | OUTPATIENT
Start: 2023-01-18 | End: 2023-01-18 | Stop reason: HOSPADM

## 2023-01-18 RX ORDER — FLUMAZENIL 0.1 MG/ML
0.2 INJECTION INTRAVENOUS AS NEEDED
Status: DISCONTINUED | OUTPATIENT
Start: 2023-01-18 | End: 2023-01-18 | Stop reason: HOSPADM

## 2023-01-18 RX ORDER — OXYMETAZOLINE HYDROCHLORIDE 0.05 G/100ML
2 SPRAY NASAL
Status: COMPLETED | OUTPATIENT
Start: 2023-01-18 | End: 2023-01-18

## 2023-01-18 RX ORDER — OXYMETAZOLINE HYDROCHLORIDE 0.05 G/100ML
SPRAY NASAL AS NEEDED
Status: DISCONTINUED | OUTPATIENT
Start: 2023-01-18 | End: 2023-01-18 | Stop reason: HOSPADM

## 2023-01-18 RX ORDER — ROCURONIUM BROMIDE 10 MG/ML
INJECTION, SOLUTION INTRAVENOUS AS NEEDED
Status: DISCONTINUED | OUTPATIENT
Start: 2023-01-18 | End: 2023-01-18 | Stop reason: SURG

## 2023-01-18 RX ORDER — SODIUM CHLORIDE, SODIUM LACTATE, POTASSIUM CHLORIDE, CALCIUM CHLORIDE 600; 310; 30; 20 MG/100ML; MG/100ML; MG/100ML; MG/100ML
1000 INJECTION, SOLUTION INTRAVENOUS CONTINUOUS
Status: DISCONTINUED | OUTPATIENT
Start: 2023-01-18 | End: 2023-01-18 | Stop reason: HOSPADM

## 2023-01-18 RX ORDER — ONDANSETRON 2 MG/ML
4 INJECTION INTRAMUSCULAR; INTRAVENOUS ONCE AS NEEDED
Status: DISCONTINUED | OUTPATIENT
Start: 2023-01-18 | End: 2023-01-18 | Stop reason: HOSPADM

## 2023-01-18 RX ORDER — DEXAMETHASONE SODIUM PHOSPHATE 4 MG/ML
8 INJECTION, SOLUTION INTRA-ARTICULAR; INTRALESIONAL; INTRAMUSCULAR; INTRAVENOUS; SOFT TISSUE
Status: DISCONTINUED | OUTPATIENT
Start: 2023-01-18 | End: 2023-01-18 | Stop reason: HOSPADM

## 2023-01-18 RX ORDER — DROPERIDOL 2.5 MG/ML
0.62 INJECTION, SOLUTION INTRAMUSCULAR; INTRAVENOUS ONCE AS NEEDED
Status: DISCONTINUED | OUTPATIENT
Start: 2023-01-18 | End: 2023-01-18 | Stop reason: HOSPADM

## 2023-01-18 RX ORDER — SODIUM CHLORIDE 0.9 % (FLUSH) 0.9 %
10 SYRINGE (ML) INJECTION AS NEEDED
Status: DISCONTINUED | OUTPATIENT
Start: 2023-01-18 | End: 2023-01-18 | Stop reason: HOSPADM

## 2023-01-18 RX ORDER — LABETALOL HYDROCHLORIDE 5 MG/ML
5 INJECTION, SOLUTION INTRAVENOUS
Status: DISCONTINUED | OUTPATIENT
Start: 2023-01-18 | End: 2023-01-18 | Stop reason: HOSPADM

## 2023-01-18 RX ORDER — HYDROCODONE BITARTRATE AND ACETAMINOPHEN 5; 325 MG/1; MG/1
1 TABLET ORAL ONCE AS NEEDED
Status: DISCONTINUED | OUTPATIENT
Start: 2023-01-18 | End: 2023-01-18 | Stop reason: HOSPADM

## 2023-01-18 RX ORDER — OXYCODONE AND ACETAMINOPHEN 7.5; 325 MG/1; MG/1
2 TABLET ORAL EVERY 4 HOURS PRN
Status: DISCONTINUED | OUTPATIENT
Start: 2023-01-18 | End: 2023-01-18 | Stop reason: HOSPADM

## 2023-01-18 RX ORDER — MIDAZOLAM HYDROCHLORIDE 1 MG/ML
1 INJECTION INTRAMUSCULAR; INTRAVENOUS
Status: DISCONTINUED | OUTPATIENT
Start: 2023-01-18 | End: 2023-01-18 | Stop reason: HOSPADM

## 2023-01-18 RX ORDER — FENTANYL CITRATE 50 UG/ML
25 INJECTION, SOLUTION INTRAMUSCULAR; INTRAVENOUS
Status: DISCONTINUED | OUTPATIENT
Start: 2023-01-18 | End: 2023-01-18 | Stop reason: HOSPADM

## 2023-01-18 RX ORDER — MAGNESIUM HYDROXIDE 1200 MG/15ML
LIQUID ORAL AS NEEDED
Status: DISCONTINUED | OUTPATIENT
Start: 2023-01-18 | End: 2023-01-18 | Stop reason: HOSPADM

## 2023-01-18 RX ORDER — FENTANYL CITRATE 50 UG/ML
INJECTION, SOLUTION INTRAMUSCULAR; INTRAVENOUS AS NEEDED
Status: DISCONTINUED | OUTPATIENT
Start: 2023-01-18 | End: 2023-01-18 | Stop reason: SURG

## 2023-01-18 RX ORDER — EPINEPHRINE 1 MG/ML
INJECTION, SOLUTION, CONCENTRATE INTRAVENOUS AS NEEDED
Status: DISCONTINUED | OUTPATIENT
Start: 2023-01-18 | End: 2023-01-18 | Stop reason: HOSPADM

## 2023-01-18 RX ORDER — OXYCODONE AND ACETAMINOPHEN 10; 325 MG/1; MG/1
1 TABLET ORAL ONCE AS NEEDED
Status: COMPLETED | OUTPATIENT
Start: 2023-01-18 | End: 2023-01-18

## 2023-01-18 RX ORDER — SODIUM CHLORIDE 0.9 % (FLUSH) 0.9 %
SYRINGE (ML) INJECTION AS NEEDED
Status: DISCONTINUED | OUTPATIENT
Start: 2023-01-18 | End: 2023-01-18 | Stop reason: HOSPADM

## 2023-01-18 RX ORDER — DEXAMETHASONE SODIUM PHOSPHATE 4 MG/ML
INJECTION, SOLUTION INTRA-ARTICULAR; INTRALESIONAL; INTRAMUSCULAR; INTRAVENOUS; SOFT TISSUE AS NEEDED
Status: DISCONTINUED | OUTPATIENT
Start: 2023-01-18 | End: 2023-01-18 | Stop reason: SURG

## 2023-01-18 RX ORDER — DEXTROSE MONOHYDRATE 25 G/50ML
12.5 INJECTION, SOLUTION INTRAVENOUS AS NEEDED
Status: DISCONTINUED | OUTPATIENT
Start: 2023-01-18 | End: 2023-01-18 | Stop reason: HOSPADM

## 2023-01-18 RX ORDER — OXYMETAZOLINE HYDROCHLORIDE 0.05 G/100ML
2 SPRAY NASAL ONCE
Status: DISCONTINUED | OUTPATIENT
Start: 2023-01-18 | End: 2023-01-18 | Stop reason: HOSPADM

## 2023-01-18 RX ORDER — SODIUM CHLORIDE 0.9 % (FLUSH) 0.9 %
3 SYRINGE (ML) INJECTION AS NEEDED
Status: DISCONTINUED | OUTPATIENT
Start: 2023-01-18 | End: 2023-01-18 | Stop reason: HOSPADM

## 2023-01-18 RX ORDER — LIDOCAINE HYDROCHLORIDE AND EPINEPHRINE 10; 10 MG/ML; UG/ML
INJECTION, SOLUTION INFILTRATION; PERINEURAL AS NEEDED
Status: DISCONTINUED | OUTPATIENT
Start: 2023-01-18 | End: 2023-01-18 | Stop reason: HOSPADM

## 2023-01-18 RX ORDER — PROPOFOL 10 MG/ML
VIAL (ML) INTRAVENOUS AS NEEDED
Status: DISCONTINUED | OUTPATIENT
Start: 2023-01-18 | End: 2023-01-18 | Stop reason: SURG

## 2023-01-18 RX ORDER — ONDANSETRON 2 MG/ML
INJECTION INTRAMUSCULAR; INTRAVENOUS AS NEEDED
Status: DISCONTINUED | OUTPATIENT
Start: 2023-01-18 | End: 2023-01-18 | Stop reason: SURG

## 2023-01-18 RX ORDER — HYDROCODONE BITARTRATE AND ACETAMINOPHEN 5; 325 MG/1; MG/1
1 TABLET ORAL EVERY 4 HOURS PRN
Qty: 15 TABLET | Refills: 0 | Status: SHIPPED | OUTPATIENT
Start: 2023-01-18 | End: 2023-01-21

## 2023-01-18 RX ORDER — SODIUM CHLORIDE 0.9 % (FLUSH) 0.9 %
10 SYRINGE (ML) INJECTION EVERY 12 HOURS SCHEDULED
Status: DISCONTINUED | OUTPATIENT
Start: 2023-01-18 | End: 2023-01-18 | Stop reason: HOSPADM

## 2023-01-18 RX ORDER — SCOLOPAMINE TRANSDERMAL SYSTEM 1 MG/1
1 PATCH, EXTENDED RELEASE TRANSDERMAL ONCE
Status: DISCONTINUED | OUTPATIENT
Start: 2023-01-18 | End: 2023-01-18 | Stop reason: HOSPADM

## 2023-01-18 RX ORDER — SUCCINYLCHOLINE/SOD CL,ISO/PF 200MG/10ML
SYRINGE (ML) INTRAVENOUS AS NEEDED
Status: DISCONTINUED | OUTPATIENT
Start: 2023-01-18 | End: 2023-01-18 | Stop reason: SURG

## 2023-01-18 RX ORDER — SODIUM CHLORIDE, SODIUM LACTATE, POTASSIUM CHLORIDE, CALCIUM CHLORIDE 600; 310; 30; 20 MG/100ML; MG/100ML; MG/100ML; MG/100ML
9 INJECTION, SOLUTION INTRAVENOUS CONTINUOUS
Status: DISCONTINUED | OUTPATIENT
Start: 2023-01-18 | End: 2023-01-18 | Stop reason: HOSPADM

## 2023-01-18 RX ADMIN — DEXAMETHASONE SODIUM PHOSPHATE 8 MG: 4 INJECTION INTRA-ARTICULAR; INTRALESIONAL; INTRAMUSCULAR; INTRAVENOUS; SOFT TISSUE at 08:05

## 2023-01-18 RX ADMIN — Medication 160 MG: at 09:52

## 2023-01-18 RX ADMIN — Medication 2 SPRAY: at 08:05

## 2023-01-18 RX ADMIN — SODIUM CHLORIDE, POTASSIUM CHLORIDE, SODIUM LACTATE AND CALCIUM CHLORIDE 1000 ML: 600; 310; 30; 20 INJECTION, SOLUTION INTRAVENOUS at 07:31

## 2023-01-18 RX ADMIN — DEXAMETHASONE SODIUM PHOSPHATE 4 MG: 4 INJECTION, SOLUTION INTRA-ARTICULAR; INTRALESIONAL; INTRAMUSCULAR; INTRAVENOUS; SOFT TISSUE at 10:14

## 2023-01-18 RX ADMIN — LIDOCAINE HYDROCHLORIDE 100 MG: 20 INJECTION, SOLUTION EPIDURAL; INFILTRATION; INTRACAUDAL; PERINEURAL at 09:52

## 2023-01-18 RX ADMIN — FENTANYL CITRATE 100 MCG: 50 INJECTION INTRAMUSCULAR; INTRAVENOUS at 09:52

## 2023-01-18 RX ADMIN — ROCURONIUM BROMIDE 25 MG: 50 INJECTION INTRAVENOUS at 10:03

## 2023-01-18 RX ADMIN — PROPOFOL INJECTABLE EMULSION 180 MG: 10 INJECTION, EMULSION INTRAVENOUS at 09:52

## 2023-01-18 RX ADMIN — OXYCODONE AND ACETAMINOPHEN 1 TABLET: 325; 10 TABLET ORAL at 11:08

## 2023-01-18 RX ADMIN — ONDANSETRON 4 MG: 2 INJECTION INTRAMUSCULAR; INTRAVENOUS at 10:14

## 2023-01-18 RX ADMIN — SCOPALAMINE 1 PATCH: 1 PATCH, EXTENDED RELEASE TRANSDERMAL at 08:05

## 2023-01-18 RX ADMIN — ROCURONIUM BROMIDE 5 MG: 50 INJECTION INTRAVENOUS at 09:52

## 2023-01-18 NOTE — OP NOTE
Jason Rudolph MD   Operative Note    Angle Valdivia  1/18/2023    Pre-op Diagnosis:   Chronic rhinitis [J31.0]  Chronic sinusitis, unspecified location [J32.9]  Hypertrophy of inferior nasal turbinate [J34.3]  Mucous retention cyst of maxillary sinus [J34.1]    Post-op Diagnosis:     Post-Op Diagnosis Codes:     * Chronic rhinitis [J31.0]     * Chronic sinusitis, unspecified location [J32.9]     * Hypertrophy of inferior nasal turbinate [J34.3]     * Mucous retention cyst of maxillary sinus [J34.1]    Procedure/CPT® Codes:  MS NASAL/SINUS NDSC SURG W/DILATION MAXILLARY SINUS [53310]  MS ABLTJ SOF TISS INF TURBS UNI/BI SUPFC INTRAMURAL [25037]  MS FRACTURE NASAL INFERIOR TURBINATE THERAPEUTIC [86608]    Procedure(s):  Bilateral maxillary sinus balloon dilation (Bilateral)  bilateral Coblation inferior turbinoplasty with outfracturing (N/A)     Surgeon(s):  Jason Rudolph MD    Anesthesia: General    Staff:   Circulator: Lulu Stroud RN  Scrub Person: Vandana Hawley; Constance Sims CST    Estimated Blood Loss:   minimal    Specimens:                None      Drains:   none    Findings:   There is bilateral inferior turbinate hypertrophy.  There is no evidence of polyposis.  There is mucoid secretions from the maxillary sinus.    Complications:   none    Reason for the Operation:  Angle Valdivia is a 27 y.o. female with a history of chronic sinusitis and nasal congestion.. It was felt the patient would benefit from inferior turbinoplasty and functional endoscopic sinus surgery: bilateral endoscopic maxillary balloon sinuplasty. After a discussion of the risks, benefits and alternatives, she consented to the procedure.     Procedure Description:  The patient was taken back to the operating room, placed supine on the operating table and placed under anesthesia by the anesthesia staff. Once this was done a time out was performed to confirm the patient and the proper procedure. The nose was prepared  1% lidocaine with 1:100,000 epinephrine and topical oxymetazoline soaked on neural pledgets.  Once this was done in 1 to 10,000 parts adrenaline cottonoids were placed for better hemostasis.  The right nasal cavity was exposed with a 0 degree telescope.  The middle meatus was exposed by medializing the middle turbinate with a Fortuna elevator.  The uncinate was partially medialized and then attempts to perform balloon dilation were performed.  There was a tight middle meatus and I ended up removing the uncinate process.  I used the balloon to dilate the sinus.  I remove some of the bone and mucosa around this with while Shelly instruments until there was a good opening.  On the left side, the middle turbinate was medialized with a Fortuna elevator.  I gently medialized the lower portion of the uncinate process and was able to place the maxillary balloon and dilated with several passes.  Mucoid secretions were obtained.  I then perform inferior turbinate reduction using the Coblation device.  I placed the device along the inferior aspect of the turbinate and then withdrew it activating the device with the ablate setting for a setting of 4 while withdrawing the device over the 2 to 3 seconds.  A second channel was created in the bulbous portion of the inferior turbinate.  First the right side was performed on the left side.  Turbinate was then outfractured with a Twiggs elevator.  The nose was irrigated.  Afrin spray was then applied.    The patient was then turned over to the anesthesia team and allowed to wake from anesthesia. The patient was transported to the recovery room in a stable condition.       Jason Rudolph MD     Date: 1/18/2023  Time: 10:36 CST

## 2023-01-18 NOTE — DISCHARGE INSTRUCTIONS
WHAT TO EXPECT AFTER SINUS SURGERY    NASAL DRAINAGE  Following surgery, you will have drainage from your nose.  At first, there may be a small amount of bright red blood.  This is normal and may continue for the first week.  A gauze dressing will be placed on your upper lip to absorb the drainage.  You may need to change the dressing several times on the day of your surgery.  Old blood which collected during surgery will drain a dark reddish brown for a week or more.  Later the drainage may change to a thicker yellow green color.  This is also normal after sinus surgery and is not a sign of infection.  Any bleeding that lasts more than 10 minutes or is heavy should be reported to your doctor immediately.    NASAL CONGESTION  You might have nasal packing after surgery.  If you do, you will need to return in 24-48 hours to have it removed by your doctor.  For the first week after surgery, your head will feel stuffy.  There will be swelling of the mucous membranes that will sometimes persist for several weeks after the operation.  The swelling will go away with time and you will be able to breathe out of your nose more easily.    DISCOMFORT AFTER SURGERY  Most people describe their discomfort after surgery as a dull ache or pressure feeling, but it varies from person to person.  Take the pain medicine as prescribed.   If your pain is lessening, you can switch to plain Tylenol.  Do not take aspirin, ibuprofen, naproxen or other aspirin-like-product for 2-3 weeks after surgery.  Remember that some over the counter drugs may contain these drugs and you might not know it.  If there is a question, ask your pharmacist.    WEAKNESS AND TIREDNESS  You may notice that you feel more weak and tired after this operation, or you may find it hard to concentrate.  Remember that even though you may not have any external incision, you still have had an operation.  This should improve after 7-10 days of recuperation.    SUGGESTIONS FOR  COMFORT  Sleep with head elevated on 2-3 pillows  Protect your nose from being hit  Use a cool mist humidifier to moisten crusts  Use saline spray every 1-2 hours to prevent crusts    SALINE RINSES  It is very important to use nasal saline (salt water) rinses to clean the nose after surgery to help remove old blood and cut down on the amount of crusting in the nose. This needs to be done at least 3-4 times a day and preferably more often as tolerated. Initially, you may only be able to use small amounts of a squirt bottle of nasal saline ([place] spray or generic sprays are over the counter in any pharmacy). If tolerated, the best way is to use a saline rinse such as the Dominick-Med Sinuneb. This is also over the coutner in most pharmacies and comes with premixed packets that you add to water to make the saline. It is important not to use tap water without boiling to remove potential infection from the water. As an alternative, you can use store bought distilled saline. If you have been told to add medication (steroids or antibiotics) to the  Rinse, a separate instruction sheet will instruct you how to do it.                MEDICINES  You will be given an antibiotic and a pain medication prescription to prevent infection and pain.  Take them as directed.   If antibiotics are given, be sure to take all the antibiotic pills.  Hold on using your nasal steroid sprays until 3 weeks after the operation or until your doctor tells you to resume it.  Hold antihistamines for one week.    BLEEDING:  A small amount of bloody discharge is considered normal.  Active bleeding that soaks a gauze or is continuous should be treated.  Use decongestant spray (neosynephrine, Afrin or other over the counter decongestant spray) in the affected nostril and hold equal pressure on the fleshy part of the nostril for 5 minutes.  If the bleeding continues, repeat a second time.  If this continues, call the office or go to the emergency room for  evaluation.    POSTOPERATIVE APPOINTMENTS  During the postoperative period, crusting can form from dried secretions and old blood.  If not cleaned out, these crusts can set up scar formation and can close off the surgically opened sinuses.  For this reason, it is imperative to keep all of your appointments after surgery to clean out these crusts.  Make sure you have someone else drive if you have taken the pain medication.    PRECAUTIONS AFTER SURGERY  Activity:  For several weeks after your surgery, your body needs additional rest for healing.  You should do no heavy lifting of objects greater than 5 lb.  (a full gallon milk jug) for 2-3 weeks.  Avoid bending over or straining.  This can place pressure at the operative site and may cause bleeding.  If you are constipated, take a stool softener or gentle laxative.  Most people will return to work or school in 1-2 weeks after surgery.  Refrain from sexual activity for the first 72 hours.  Physical activity can be resumed after 2-3 weeks at a gradual rate. Avoid driving while on pain medication and avoid flying for 3-4 weeks after surgery.     DIET: You may keep a normal healthy diet.   SNEEZING:  If you have to sneeze, do so with your mouth open to avoid  pressure, discomfort  and/or bleeding.  NOSE BLOWING:  Do not aggressively blow your nose.  This can provoke bleeding.   IRRITANTS:  Avoid smoke, dust, fumes or anything else that may irritate your nose.  Also keep away from things that may provoke your allergies.    Notify your Doctor or go to the emergency room if you have:   *Any bright red bleeding that lasts longer than 10 minutes or is heavy.   *Any vision problems like double vision, loss of vision, or bulging of the eye.   *Neck stiffness along with fever, tiredness and severe headaches.   *Fever greater than 101 which continues even after Tylenol.   *Persistent sharp pain not relieved by the prescribed pain medicine.   *Increase in swelling or  redness.  *Drainage of thin clear watery fluid from the nose in large quantities that is different from the normal clear thicker mucous drainage.     CONTACT INFORMATION:  The main office phone number is [phone]. For emergencies after hours and on weekends, this number will convert over to our answering service and the on call provider will answer. Please try to keep non emergent phone calls/ questions to office hours 9am-5pm Monday through Friday.     [employer]  As an alternative, you can sign up and use the Epic MyChart system for more direct and quicker access for non emergent questions/ problems.  [facility] [employer] allows you to send messages to your doctor, view your test results, renew your prescriptions, schedule appointments, and more.

## 2023-01-18 NOTE — ANESTHESIA PROCEDURE NOTES
Airway  Urgency: elective    Date/Time: 1/18/2023 9:53 AM  Airway not difficult    General Information and Staff    Patient location during procedure: OR  CRNA/CAA: Adilson Mills CRNA    Indications and Patient Condition  Indications for airway management: airway protection    Preoxygenated: yes  Mask difficulty assessment: 1 - vent by mask    Final Airway Details  Final airway type: endotracheal airway      Successful airway: ETT  Cuffed: yes   Successful intubation technique: direct laryngoscopy  Endotracheal tube insertion site: oral  Blade: Niall  Blade size: 3.5  ETT size (mm): 7.5  Cormack-Lehane Classification: grade I - full view of glottis  Placement verified by: chest auscultation and capnometry   Cuff volume (mL): 6  Measured from: lips  ETT/EBT  to lips (cm): 21  Number of attempts at approach: 1  Assessment: lips, teeth, and gum same as pre-op and atraumatic intubation

## 2023-01-18 NOTE — ANESTHESIA POSTPROCEDURE EVALUATION
Patient: Angle Valdivia    Procedure Summary     Date: 01/18/23 Room / Location:  [facility] OR  /  [facility] OR    Anesthesia Start: 0946 Anesthesia Stop: 1053    Procedures:       Bilateral maxillary sinus balloon dilation (Bilateral: Nose)      bilateral Coblation inferior turbinoplasty with outfracturing (Nose) Diagnosis:       Chronic rhinitis      Chronic sinusitis, unspecified location      Hypertrophy of inferior nasal turbinate      Mucous retention cyst of maxillary sinus      (Chronic rhinitis [J31.0])      (Chronic sinusitis, unspecified location [J32.9])      (Hypertrophy of inferior nasal turbinate [J34.3])      (Mucous retention cyst of maxillary sinus [J34.1])    Surgeons: Jason Rudolph MD Provider: Adilson Mills CRNA    Anesthesia Type: general ASA Status: 1          Anesthesia Type: general    Vitals  Vitals Value Taken Time   /83 01/18/23 1123   Temp 97.5 °F (36.4 °C) 01/18/23 1050   Pulse 75 01/18/23 1124   Resp 16 01/18/23 1123   SpO2 97 % 01/18/23 1124   Vitals shown include unvalidated device data.        Post Anesthesia Care and Evaluation    Patient location during evaluation: PACU  Patient participation: complete - patient participated  Level of consciousness: awake and alert  Pain management: adequate    Airway patency: patent  Anesthetic complications: No anesthetic complications    Cardiovascular status: acceptable  Respiratory status: acceptable  Hydration status: acceptable    Comments: Blood pressure 121/83, pulse 68, temperature 97.5 °F (36.4 °C), temperature source Temporal, resp. rate 16, last menstrual period 01/17/2023, SpO2 97 %, not currently breastfeeding.    Pt discharged from PACU based on edgard score >8

## 2023-01-18 NOTE — ANESTHESIA PREPROCEDURE EVALUATION
Anesthesia Evaluation     Patient summary reviewed   no history of anesthetic complications:  NPO Solid Status: > 8 hours             Airway   Mallampati: I  Dental      Pulmonary - negative pulmonary ROS   Cardiovascular - negative cardio ROS  Exercise tolerance: excellent (>7 METS)        Neuro/Psych- negative ROS  GI/Hepatic/Renal/Endo - negative ROS     Musculoskeletal     Abdominal    Substance History      OB/GYN    (-)  Pregnant        Other                        Anesthesia Plan    ASA 1     general     intravenous induction     Anesthetic plan, risks, benefits, and alternatives have been provided, discussed and informed consent has been obtained with: patient.        CODE STATUS:

## 2023-02-02 ENCOUNTER — OFFICE VISIT (OUTPATIENT)
Dept: OTOLARYNGOLOGY | Facility: CLINIC | Age: 28
End: 2023-02-02
Payer: COMMERCIAL

## 2023-02-02 VITALS — WEIGHT: 170.4 LBS | HEIGHT: 71 IN | BODY MASS INDEX: 23.85 KG/M2

## 2023-02-02 DIAGNOSIS — J32.9 CHRONIC SINUSITIS, UNSPECIFIED LOCATION: Primary | ICD-10-CM

## 2023-02-02 PROCEDURE — 31237 NSL/SINS NDSC SURG BX POLYPC: CPT | Performed by: OTOLARYNGOLOGY

## 2023-02-02 NOTE — PROGRESS NOTES
Jason Rudolph MD   Chief Complaint:  Follow up s/p sinonasal surgery     HPI  Angle Valdivia is a  27 y.o.  female who presents for follow up s/p Bilateral maxillary sinus balloon dilation - Bilateral and bilateral Coblation inferior turbinoplasty with outfracturing 1/18/2023. The patient has had a relatively normal postoperative course and currently has no related complaints.      Vital Signs:     Nasal endoscopy with bilateral nasal debridement    Date/Time: 2/2/2023 10:52 AM  Performed by: Jason Rudolph MD  Authorized by: Jason Rudolph MD     Consent:     Consent obtained:  Verbal    Consent given by:  Patient    Risks discussed:  Bleeding and pain    Alternatives discussed:  Observation  Anesthesia (see MAR for exact dosages):     Anesthesia method:  Topical application    Topical anesthetic:  Tetracaine  Procedure details:     Indications: post-operative debridement      Medication:  Afrin    The nose was examined and debrided using a rigid nasal endoscopy and forceps and suction      Debridement location:  Bilateral nasal debridement  Nasal cavity:     Right inferior turbinates: crusting and post-surgical changes      Left inferior turbinates: crusting and post-surgical changes    Sinus/ Nasopharynx:     Right middle meatus: post-surgical changes, antrostomy open and clean and scarring      Left middle meatus: post-surgical changes and antrostomy open and clean    Post-procedure details:     Patient tolerance of procedure:  Tolerated well       Assessment    Diagnoses and all orders for this visit:    1. Chronic sinusitis, unspecified location (Primary)  -     Nasal endoscopy with bilateral nasal debridement            May resume previous nasal sprays  Slowly return to normal activity  You may blow your nose gently  Continue nasal irrigations as needed     Return in about 3 months (around 5/2/2023).      Jason Rudolph MD  02/02/23  10:53 CST

## 2023-05-18 ENCOUNTER — OFFICE VISIT (OUTPATIENT)
Dept: OTOLARYNGOLOGY | Facility: CLINIC | Age: 28
End: 2023-05-18
Payer: COMMERCIAL

## 2023-05-18 VITALS — WEIGHT: 177.2 LBS | TEMPERATURE: 98.2 F | BODY MASS INDEX: 24.81 KG/M2 | HEIGHT: 71 IN

## 2023-05-18 DIAGNOSIS — J30.9 ALLERGIC RHINITIS, UNSPECIFIED SEASONALITY, UNSPECIFIED TRIGGER: ICD-10-CM

## 2023-05-18 DIAGNOSIS — J32.9 CHRONIC SINUSITIS, UNSPECIFIED LOCATION: Primary | ICD-10-CM

## 2023-05-18 PROBLEM — J34.3 HYPERTROPHY OF INFERIOR NASAL TURBINATE: Status: RESOLVED | Noted: 2022-11-28 | Resolved: 2023-05-18

## 2023-05-18 NOTE — PATIENT INSTRUCTIONS
Neilmed Saline Nasal Rinse  http://[email or web address]/usa/directions-for-use.php        Step 1  Please wash your hands. Fill the clean bottle with the designated volume of either distilled, micro-filtered (through 0.2 micron filter), commercially bottled or previously boiled and cooled down water. Always rinse your nasal passages with NeilMed® Sinus Rinse™ packets only. Our packets contain a mixture of USP grade sodium chloride and sodium bicarbonate. These ingredients are of the purest quality available to make the dry powder mixture. Rinsing your nasal passages with only plain water without our mixture will result in a severe burning sensation as the plain water is not physiologic for your nasal lining, even if it is appropriate for drinking. Additionally, for your safety, do not use tap or faucet water for dissolving the mixture unless it has been previously boiled for five minutes or more as boiling sterilizes the water. Other choices are distilled, micro-filtered (through 0.2 micron), commercially bottled or, as mentioned earlier, previously boiled water at lukewarm or body temperature. You can store boiled water in a clean container for seven days or more if refrigerated. Do not use non-chlorinated or non-ultra (0.2 micron) filtered well water unless it is boiled and then cooled to lukewarm or body temperature.  *You may warm the water in a microwave in increments of 5 to 10 seconds to avoid overheating the water, damaging the device or scalding your nasal passage.   Step 2  Cut the Sinus Rinse™ mixture packet at the corner and pour its contents into the bottle. Tighten the cap and tube on the bottle securely. Place one finger over the tip of the cap and shake the bottle gently to dissolve the mixture.   Step 3  Standing in front of a sink, bend forward to your comfort level and tilt your head down. Keeping your mouth open, without holding your breath, place the cap snugly against your nasal passage. SQUEEZE  BOTTLE GENTLY until the solution starts draining from the OPPOSITE nasal passage. Some may drain from your mouth. For a proper rinse, keep squeezing the bottle GENTLY until at least 1/4 to 1/2 (60 mL to 120 mL or 2 to 4 fl oz) of the bottle is used. Do not swallow the solution.   Step 4  Blow your nose very gently, without pinching nose completely to avoid pressure on eardrums. If tolerable, sniff in gently any residual solution remaining in the nasal passage once or twice, because this may clean out the posterior nasopharyngeal area, which is the area at the back of your nasal passage. At times, some solution will reach the back of your throat, so please spit it out. To help drain any residual solution, blow your nose gently while tilting your head forward and to the opposite side of the nasal passage you just rinsed.  Step 5  Now repeat steps 3 and 4 for your other nasal passage. Most users fi nd that rinsing twice a day is beneficial, similar to brushing your teeth. Many doctors recommend rinsing 3-4 times daily or for special circumstances, even rinsing up to 6 times a day is safe. Please follow your physician’s advice.      CONTACT INFORMATION:  The main office phone number is [phone]. For emergencies after hours and on weekends, this number will convert over to our answering service and the on call provider will answer. Please try to keep non emergent phone calls/ questions to office hours 9am-5pm Monday through Friday.     [employer]  As an alternative, you can sign up and use the Epic MyChart system for more direct and quicker access for non emergent questions/ problems.  [employer] allows you to send messages to your doctor, view your test results, renew your prescriptions, schedule appointments, and more. To sign up, go to [email or web address].    If you have questions, you can email [email or web address] or call [phone] to talk to our [employer] staff. Remember, [employer] is NOT  to be used for urgent needs. For medical emergencies, dial 911.

## 2023-05-18 NOTE — PROGRESS NOTES
"KYRIE Rudolph MD  [facility] EAR [facility]SE & THROAT  [address], [address]  [place] [address]  Dept: [phone]  Dept Fax: [phone]  Loc: [phone]       Visit Type: FOLLOW UP   Chief Complaint   Patient presents with    Allergic Rhinitis    Nasal Congestion        HPI  Angle Valdivia is a 27 y.o. female. She is status post bilateral maxillary sinus balloon sinuplasty and inferior turbinoplasty on 2023. She consents to the use of ELLIOTT.    Previously, we performed balloon sinus surgery, reducing some sidewall projections. She reports she has had terrible allergies since then, with daily postnasal drip. However, she can breathe now. She also has pruritic, watery eyes. She has not had allergy testing performed for about 10 years. The patient asks if allergy testing can be done while she is pregnant. The patient has intermittently been using Flonase but was using Xyzal until she realized it was not on her list of approved medications during pregnancy. She quit taking it today. She reports previously testing as allergic to grass and dogs. She has a dog.    She reports currently having a \"chest cold.\" Her gynecologist prescribed a Z-Jhony.    Past Medical History:   Diagnosis Date    Anxiety     Depression     Pregnancy     Sinus pressure        Past Surgical History:   Procedure Laterality Date     SECTION      ENDOSCOPIC FUNCTIONAL SINUS SURGERY (FESS) Bilateral 2023    Procedure: Bilateral maxillary sinus balloon dilation;  Surgeon: Jason Rudolph MD;  Location: [facility] OR;  Service: ENT;  Laterality: Bilateral;    KNEE SURGERY Bilateral 2011,    TURBINOPLASTY N/A 2023    Procedure: bilateral Coblation inferior turbinoplasty with outfracturing;  Surgeon: Jason Rudolph MD;  Location: [facility] OR;  Service: ENT;  Laterality: N/A;       Family History: Her family history is not on file.     Social " History: She  reports that she has never smoked. She has never used smokeless tobacco. She reports current alcohol use. She reports that she does not use drugs.    Home Medications:  Levocetirizine Dihydrochloride, buPROPion SR, clomiPHENE Citrate, multivitamin with minerals, and saccharomyces boulardii    Allergies:  She has No Known Allergies.       Vital Signs:   Temp:  [98.2 °F (36.8 °C)] 98.2 °F (36.8 °C)  ENT Physical Exam  Nose  External Nose: nares patent bilaterally; external nose normal;  Internal Nose: nasal mucosa normal; bilateral inferior turbinates normal;  Nose comments: Nasal septum is straight  Side wall projections are not overly congested.  No polyps appreciated.         Result Review    RESULTS REVIEW    Old records reviewed    Assessment & Plan    Diagnoses and all orders for this visit:    1. Chronic sinusitis, unspecified location (Primary)    2. Allergic rhinitis, unspecified seasonality, unspecified trigger         1. Allergic rhinitis  - Recommend allergy testing in the future.  - Discussed that allergy testing could be done during pregnancy, but the concern would be if she had a significant reaction to an allergen. The treatment for the reaction could be harmful to the fetus.  - Discussed rhinitis of pregnancy.   - Recommend Rhinocort Aqua nasal spray and Claritin. We discussed that Rhinocort Aqua and Claritin are Class B medications.  - Recommended saline rinses, particularly when pollen counts are high. She was instructed to use boiled or distilled water, not tap water.   - Recommend she not let her pet go outside often or let it sleep with her.  - Suggested strategies for keeping pollen away from living areas. Recommended changing central air filter frequently and using those that are allergy rated, or using an air purifier. Also recommended cleaning bed linens frequently. Hardwood or low pile floor coverings.         Return in about 6 months (around 11/18/2023) for follow up with  Shruthi GARCÍA.            Transcribed from ambient dictation for Jason Rudolph MD by Cindy Juares.  05/18/23   11:14 CDT    Patient or patient representative verbalized consent to the visit recording.  I have personally performed the services described in this document as transcribed by the above individual, and it is both accurate and complete.  Jason Rudolph MD  5/18/2023  16:18 CDT

## 2023-11-07 ENCOUNTER — OFFICE VISIT (OUTPATIENT)
Dept: OTOLARYNGOLOGY | Facility: CLINIC | Age: 28
End: 2023-11-07
Payer: COMMERCIAL

## 2023-11-07 VITALS — TEMPERATURE: 97.8 F | BODY MASS INDEX: 28.7 KG/M2 | WEIGHT: 205 LBS | HEIGHT: 71 IN

## 2023-11-07 DIAGNOSIS — J30.9 ALLERGIC RHINITIS, UNSPECIFIED SEASONALITY, UNSPECIFIED TRIGGER: Primary | ICD-10-CM

## 2023-11-07 RX ORDER — OMEPRAZOLE 40 MG/1
CAPSULE, DELAYED RELEASE ORAL
COMMUNITY
Start: 2023-11-01

## 2023-11-07 RX ORDER — INCONTINENCE PAD,LINER,DISP
1 EACH MISCELLANEOUS 2 TIMES DAILY WITH MEALS
COMMUNITY
Start: 2023-10-15

## 2023-11-07 NOTE — PROGRESS NOTES
RICKY Cheema  [facility] EAR NOSE & THROAT  [address], [address]  [place] [address]  Fax [phone]  Phone [phone]      Visit Type: FOLLOW UP   Chief Complaint   Patient presents with    Allergic Rhinitis        HPI  She presents for a follow up evaluation. She has had continued problems with nasal congestion and allergy.      She is currently pregnant and plans on breast feeding.    Past Medical History:   Diagnosis Date    Anxiety     Depression     Pregnancy     Sinus pressure        Past Surgical History:   Procedure Laterality Date     SECTION      ENDOSCOPIC FUNCTIONAL SINUS SURGERY (FESS) Bilateral 2023    Procedure: Bilateral maxillary sinus balloon dilation;  Surgeon: Jason Rudolph MD;  Location: [facility];  Service: ENT;  Laterality: Bilateral;    KNEE SURGERY Bilateral 2011,    TURBINOPLASTY N/A 2023    Procedure: bilateral Coblation inferior turbinoplasty with outfracturing;  Surgeon: Jason Rudolph MD;  Location: [facility];  Service: ENT;  Laterality: N/A;       Family History: Her family history is not on file.     Social History: She  reports that she has never smoked. She has never used smokeless tobacco. She reports current alcohol use. She reports that she does not use drugs.    Home Medications:  buPROPion SR, budesonide, ferrous sulfate, multivitamin with minerals, and omeprazole    Allergies:  She has No Known Allergies.       Vital Signs:   Temp:  [97.8 °F (36.6 °C)] 97.8 °F (36.6 °C)  ENT Physical Exam  Constitutional  Appearance: patient appears well-developed, well-nourished and well-groomed,  Communication/Voice: communication appropriate for developmental age; vocal quality normal;  Head and Face  Appearance: head appears normal, face appears normal and face appears atraumatic;  Palpation: facial palpation normal;  Salivary: glands normal;  Ear  Hearing:  intact;  Auricles: right auricle normal; left auricle normal;  External Mastoids: right external mastoid normal; left external mastoid normal;  Ear Canals: right ear canal normal; left ear canal normal;  Tympanic Membranes: right tympanic membrane normal; left tympanic membrane normal;  Nose  Internal Nose: bilateral intranasal mucosa edematous; bilateral inferior turbinates erythematous; with hypertrophy;  Oral Cavity/Oropharynx  Lips: normal;  Teeth: normal;  Gums: gingiva normal;  Tongue: normal;  Oral mucosa: normal;  Hard palate: normal;  Neck  Neck: neck normal;  Respiratory  Inspection: breathing unlabored;  Cardiovascular  Inspection: extremities are warm and well perfused;  Lymphatic  Palpation: lymph nodes normal;           Result Review    RESULTS REVIEW    I have reviewed the patients old records in the chart.     Assessment & Plan    Diagnoses and all orders for this visit:    1. Allergic rhinitis, unspecified seasonality, unspecified trigger (Primary)       She is going to call for follow up after she has finished breast feeding to set up allergy testing.    No follow-ups on file.      Electronically signed by RICKY Cheema 11/08/23 9:12 AM CST.

## (undated) DEVICE — PK ENT HD AND NK 30

## (undated) DEVICE — TUBING 1895522 5PK STRAIGHTSHOT TO XPS: Brand: STRAIGHTSHOT®

## (undated) DEVICE — BAPTIST TURNOVER KIT: Brand: MEDLINE INDUSTRIES, INC.

## (undated) DEVICE — SYR LUERLOK 20CC BX/50

## (undated) DEVICE — WIPE INST 3X3IN 2MM BX/20

## (undated) DEVICE — BLADE 1884380HR QUADCUT 5PK 4.3MM X 13CM: Brand: QUADCUT®

## (undated) DEVICE — TUBING, SUCTION, 1/4" X 12', STRAIGHT: Brand: MEDLINE

## (undated) DEVICE — SPONGE,NEURO,0.5"X3",XR,STRL,LF,10/PK: Brand: MEDLINE

## (undated) DEVICE — NDL ANES REINF 27G 3.5IN

## (undated) DEVICE — REFLEX ULTRA 45 WITH INTEGRATED CABLE: Brand: COBLATION

## (undated) DEVICE — NEEDLE, QUINCKE 25GX3.5": Brand: MEDLINE

## (undated) DEVICE — BALN DIL SINUS XPRESS LP W/LT FIBR 6X18MM

## (undated) DEVICE — KT ANTI FOG W/FLD AND SPNG

## (undated) DEVICE — GLV SURG BIOGEL M LTX PF 7 1/2